# Patient Record
Sex: MALE | Race: WHITE | NOT HISPANIC OR LATINO | Employment: OTHER | URBAN - METROPOLITAN AREA
[De-identification: names, ages, dates, MRNs, and addresses within clinical notes are randomized per-mention and may not be internally consistent; named-entity substitution may affect disease eponyms.]

---

## 2024-12-02 ENCOUNTER — APPOINTMENT (OUTPATIENT)
Dept: RADIOLOGY | Facility: HOSPITAL | Age: 64
End: 2024-12-02
Payer: MEDICARE

## 2024-12-02 ENCOUNTER — HOSPITAL ENCOUNTER (EMERGENCY)
Facility: HOSPITAL | Age: 64
Discharge: HOME | End: 2024-12-02
Attending: EMERGENCY MEDICINE
Payer: MEDICARE

## 2024-12-02 VITALS
DIASTOLIC BLOOD PRESSURE: 91 MMHG | OXYGEN SATURATION: 97 % | SYSTOLIC BLOOD PRESSURE: 174 MMHG | HEART RATE: 73 BPM | HEIGHT: 68 IN | TEMPERATURE: 97.9 F | WEIGHT: 180 LBS | BODY MASS INDEX: 27.28 KG/M2 | RESPIRATION RATE: 20 BRPM

## 2024-12-02 DIAGNOSIS — S09.90XA HEAD INJURY, INITIAL ENCOUNTER: ICD-10-CM

## 2024-12-02 DIAGNOSIS — S52.601A CLOSED FRACTURE OF DISTAL END OF RIGHT ULNA, UNSPECIFIED FRACTURE MORPHOLOGY, INITIAL ENCOUNTER: ICD-10-CM
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
## 2024-12-02 DIAGNOSIS — R03.0 ELEVATED BLOOD PRESSURE READING: ICD-10-CM

## 2024-12-02 PROCEDURE — 12001 RPR S/N/AX/GEN/TRNK 2.5CM/<: CPT

## 2024-12-02 PROCEDURE — 2500000005 HC RX 250 GENERAL PHARMACY W/O HCPCS

## 2024-12-02 PROCEDURE — 73080 X-RAY EXAM OF ELBOW: CPT | Mod: RIGHT SIDE | Performed by: RADIOLOGY

## 2024-12-02 PROCEDURE — 72125 CT NECK SPINE W/O DYE: CPT | Performed by: RADIOLOGY

## 2024-12-02 PROCEDURE — 90715 TDAP VACCINE 7 YRS/> IM: CPT

## 2024-12-02 PROCEDURE — 70486 CT MAXILLOFACIAL W/O DYE: CPT

## 2024-12-02 PROCEDURE — 99284 EMERGENCY DEPT VISIT MOD MDM: CPT | Mod: 25 | Performed by: EMERGENCY MEDICINE

## 2024-12-02 PROCEDURE — 73590 X-RAY EXAM OF LOWER LEG: CPT | Mod: RT

## 2024-12-02 PROCEDURE — 73080 X-RAY EXAM OF ELBOW: CPT | Mod: RT

## 2024-12-02 PROCEDURE — 76377 3D RENDER W/INTRP POSTPROCES: CPT | Performed by: RADIOLOGY

## 2024-12-02 PROCEDURE — 73590 X-RAY EXAM OF LOWER LEG: CPT | Mod: RIGHT SIDE | Performed by: RADIOLOGY

## 2024-12-02 PROCEDURE — 2500000001 HC RX 250 WO HCPCS SELF ADMINISTERED DRUGS (ALT 637 FOR MEDICARE OP)

## 2024-12-02 PROCEDURE — 70450 CT HEAD/BRAIN W/O DYE: CPT | Performed by: RADIOLOGY

## 2024-12-02 PROCEDURE — 73110 X-RAY EXAM OF WRIST: CPT | Mod: RIGHT SIDE | Performed by: RADIOLOGY

## 2024-12-02 PROCEDURE — 73090 X-RAY EXAM OF FOREARM: CPT | Mod: RIGHT SIDE | Performed by: RADIOLOGY

## 2024-12-02 PROCEDURE — 29125 APPL SHORT ARM SPLINT STATIC: CPT | Mod: RT

## 2024-12-02 PROCEDURE — 90471 IMMUNIZATION ADMIN: CPT

## 2024-12-02 PROCEDURE — 2500000004 HC RX 250 GENERAL PHARMACY W/ HCPCS (ALT 636 FOR OP/ED)

## 2024-12-02 PROCEDURE — 72125 CT NECK SPINE W/O DYE: CPT

## 2024-12-02 PROCEDURE — 70486 CT MAXILLOFACIAL W/O DYE: CPT | Performed by: RADIOLOGY

## 2024-12-02 PROCEDURE — 76377 3D RENDER W/INTRP POSTPROCES: CPT

## 2024-12-02 PROCEDURE — 73090 X-RAY EXAM OF FOREARM: CPT | Mod: RT

## 2024-12-02 PROCEDURE — 73110 X-RAY EXAM OF WRIST: CPT | Mod: RT

## 2024-12-02 PROCEDURE — 70450 CT HEAD/BRAIN W/O DYE: CPT

## 2024-12-02 RX ORDER — HYDROCODONE BITARTRATE AND ACETAMINOPHEN 5; 325 MG/1; MG/1
1 TABLET ORAL ONCE
Status: COMPLETED | OUTPATIENT
Start: 2024-12-02 | End: 2024-12-02

## 2024-12-02 RX ORDER — IBUPROFEN 600 MG/1
600 TABLET ORAL EVERY 6 HOURS PRN
Qty: 25 TABLET | Refills: 0 | Status: SHIPPED | OUTPATIENT
Start: 2024-12-02 | End: 2024-12-09

## 2024-12-02 RX ORDER — ACETAMINOPHEN 500 MG
1000 TABLET ORAL EVERY 8 HOURS PRN
Qty: 18 TABLET | Refills: 0 | Status: SHIPPED | OUTPATIENT
Start: 2024-12-02 | End: 2024-12-05

## 2024-12-02 RX ORDER — BACITRACIN ZINC 500 UNIT/G
OINTMENT IN PACKET (EA) TOPICAL 3 TIMES DAILY
Status: DISCONTINUED | OUTPATIENT
Start: 2024-12-02 | End: 2024-12-02 | Stop reason: HOSPADM

## 2024-12-02 RX ORDER — LIDOCAINE HYDROCHLORIDE 10 MG/ML
20 INJECTION, SOLUTION INFILTRATION; PERINEURAL ONCE
Status: COMPLETED | OUTPATIENT
Start: 2024-12-02 | End: 2024-12-02

## 2024-12-02 RX ORDER — BACITRACIN ZINC 500 UNIT/G
OINTMENT IN PACKET (EA) TOPICAL ONCE
Status: COMPLETED | OUTPATIENT
Start: 2024-12-02 | End: 2024-12-02

## 2024-12-02 ASSESSMENT — COLUMBIA-SUICIDE SEVERITY RATING SCALE - C-SSRS
1. IN THE PAST MONTH, HAVE YOU WISHED YOU WERE DEAD OR WISHED YOU COULD GO TO SLEEP AND NOT WAKE UP?: NO
2. HAVE YOU ACTUALLY HAD ANY THOUGHTS OF KILLING YOURSELF?: NO
6. HAVE YOU EVER DONE ANYTHING, STARTED TO DO ANYTHING, OR PREPARED TO DO ANYTHING TO END YOUR LIFE?: NO

## 2024-12-02 ASSESSMENT — PAIN DESCRIPTION - ORIENTATION: ORIENTATION: RIGHT

## 2024-12-02 ASSESSMENT — PAIN SCALES - GENERAL
PAINLEVEL_OUTOF10: 6
PAINLEVEL_OUTOF10: 7
PAINLEVEL_OUTOF10: 6

## 2024-12-02 ASSESSMENT — PAIN - FUNCTIONAL ASSESSMENT
PAIN_FUNCTIONAL_ASSESSMENT: 0-10
PAIN_FUNCTIONAL_ASSESSMENT: 0-10

## 2024-12-02 ASSESSMENT — PAIN DESCRIPTION - LOCATION: LOCATION: ARM

## 2024-12-02 NOTE — ED PROVIDER NOTES
HPI   Chief Complaint   Patient presents with    Fall       64-year-old male presents to the ED today with a chief concern of fall.  Patient has a history of hypertension and hyperlipidemia.  He reports that he was working this morning.  He reports that he was standing in the back of a semitruck when it started to move.  He fell out the back of the truck and the entire case of bread fell on top of him.  He did not get run over by the semi-.  He hit his head on the ground and thought that he was going to die during the event.  He did not lose consciousness.  Is not anticoagulated.  He reports that since then he has right arm pain as well as right lower leg pain.  He reports that he had a little bit of tingling going into his right first, second, and third digit.  He denies any fever/chills, nausea/vomiting.  He denies any chest pain or shortness of breath or abdominal pain.  Did not sustain any other injuries.  He has no other symptoms or concern at this time.      History provided by:  Patient          Patient History   History reviewed. No pertinent past medical history.  History reviewed. No pertinent surgical history.  No family history on file.  Social History     Tobacco Use    Smoking status: Never    Smokeless tobacco: Never   Substance Use Topics    Alcohol use: Yes     Comment: socially    Drug use: Not on file       Physical Exam   ED Triage Vitals [12/02/24 0822]   Temperature Heart Rate Respirations BP   36.6 °C (97.9 °F) 79 20 (!) 190/101      Pulse Ox Temp Source Heart Rate Source Patient Position   98 % Oral -- --      BP Location FiO2 (%)     -- --       Physical Exam  General: The patient is sitting comfortably no acute distress.  Vital signs per nursing note.  Skin: Abrasions noted on the left side of the face.  No gaping.  There is a 0.75 cm laceration over the right elbow.  There is gaping.  There is no surrounding erythema or streaking.  HEENT: The head is normocephalic.  The neck is supple.   The trachea is midline.  No tenderness to palpation of the head. No enophthalmos or exophthalmos.  PERRLA, EOMI without nystagmus.  Negative raccoon eyes. External ear anatomy is normal.  TMs are white/gray and translucent.  Light reflex and bony landmarks are present.  No erythema, bulging, or retraction of the TM.  Negative lopez sign.  Hearing is grossly intact.  No epistaxis or rhinorrhea.  Lips and buccal mucosa are pink and moist without lesions.  Tongue is midline without lesions.  Uvula is midline with symmetric elevation of the soft palate.  Normal phonation.  No hoarseness.  No muffled voice.  Lungs: Lungs are clear to auscultation bilaterally.  No rhonchi, wheezing, or rales.  No stridor.  Symmetric chest expansion  Heart: Normal S1-S2 no murmurs, rubs, gallops.  Abdomen: Abdomen is flat, nontender, nondistended.  No rebound tenderness or guarding.  No pulsatile mass.   Neurologic: Alert and oriented x4.  Thought process is coherent.   5/5 strength in the upper and lower extremity.  Sensation is intact in the upper and lower extremity.  Normal gait.  Rapid alternating motor movements are intact.  Musculoskeletal: No overlying skin changes throughout the entire back.  No C, T, L spine tenderness. Full ROM of the neck and back.  Full range of motion of right elbow and wrist with no focal bony tenderness to palpation.  There is diffuse tenderness over the right forearm but compartments are soft.  There is a small abrasion on the right forearm.  There is no gaping.  : Deferred    ED Course & MDM   ED Course as of 12/02/24 2039   Mon Dec 02, 2024   1110 IMPRESSION:  Degenerative changes without acute fracture or subluxation.      Signed by: Mick Recio 12/2/2024 10:38 AM  Dictation workstation:   [id number]   []   1110 FINDINGS:  Four view radiographs of the right wrist and additional two-view  radiographs of the right forearm were obtained. There is linear  lucency through the distal ulnar  metaphysis, consistent with a  nondisplaced fracture. No additional fractures are identified in the  wrist or forearm. There is associated soft tissue swelling.  The alignment is anatomic.  Mild degenerative changes involve the triscaphe and 1st  carpometacarpal joint and to a lesser degree the elbow.      IMPRESSION:  Nondisplaced fracture of the distal ulnar metaphysis.      Signed by: Mick Recio 12/2/2024 10:41 AM  Dictation workstation:   [id number]   []   1110 XR wrist right 3+ views []   1111 IMPRESSION:  No evidence of acute intracranial abnormality or skull fracture.          Signed by: Susannah Belle 12/2/2024 11:01 AM  Dictation workstation:   [id number]   []   1255 1, 4-0 Prolene nonabsorbable suture []   1319 Patient has a ride home today will not be driving himself home [MC]      ED Course User Index  [MC] Adarsh Espitia PA-C         Diagnoses as of 12/02/24 2039   Fall, initial encounter   Head injury, initial encounter   Closed fracture of distal end of right ulna, unspecified fracture morphology, initial encounter   Elevated blood pressure reading                 No data recorded     [place] Coma Scale Score: 15 (12/02/24 0853 : Dain Fitzpatrick RN)                           Medical Decision Making  64-year-old male presents to the ED today with a chief concern of fall.  Vital signs reassuring.  Patient overall appears well and is nontoxic-appearing.  Patient has full range of motion of the neck without any meningismus.  Satting well on room air.  Not hypoxic.  Not tachycardic.  Afebrile. The injury was mechanical.  Did not sustain any symptoms prior to the injury.  X-ray of the right tib-fib shows degenerative changes.  X-ray of the right wrist shows a nondisplaced fracture of the distal ulnar metaphysis.  The right forearm x-ray shows similar findings but no additional fractures.  X-ray of the right elbow is unremarkable.  CT C-spine shows no acute fracture.  Does show some degenerative  changes.  CT maxillofacial bones shows no facial bone fracture.  Does show contusion and subcutaneous edema.  Patient is freely moving the remainder of extremities without any difficulty.  Tetanus updated in the ED.  He did have a 0.75 cm cm laceration over the right elbow which I repaired with 1 suture.  I discussed with patient that he needs to get this removed and 7 days.  We discussed signs and symptoms of infection as well as wound care.  Bacitracin was placed over the wound prior to discharge.  He was given [place] in the ED.  He felt better after administration of this medication.  Patient was placed in a volar splint by medics.  I evaluated this after placement neurovascular status intact.  Compartments are soft.  There is no concern for compartment syndrome.  Discussed my pression and findings with patient he feels comfortable returning home.  We discussed very strict return precautions including returning for any new or worsening signs home.  Patient is in agreement this plan.  He will follow-up with his PCP and orthopedics within 3 days.  Discharged with Tylenol and ibuprofen.  He has a ride home today will not be driving himself home.  Patient was also seen and evaluated by attending physician.    Differential diagnosis: Fracture, strain, sprain, compartment syndrome    Disposition/treatment  1.  See diagnosis    Shared decision-making was used patient feels comfortable returning home     Patient was advised to follow up with recommended provider in 1 day1 for another evaluation and exam. I advised patient/guardian to return or go to closest emergency room immediately if symptoms change, get worse, new symptoms develop prior to follow up. If there is no improvement in symptoms in the next 24 hours they are advised to return for further evaluation and exam. I also explained the plan and treatment course. Patient/guardian is in agreement with plan, treatment course, and follow up and states verbally that  they will comply.    Homegoing. I discussed the differential; results and discharge plan with the patient and/or family/friend/caregiver if present.  I emphasized the importance of follow-up with the physician I referred them to in the timeframe recommended.  I explained reasons for the patient to return to the Emergency Department. They agreed that if they feel their condition is worsening or if they have any other concern they should call 911 immediately for further assistance. I gave the patient an opportunity to ask all questions they had and answered all of them accordingly. They understand return precautions and discharge instructions. The patient and/or family/friend/caregiver expressed understanding verbally and that they would comply.        This note has been transcribed using voice recognition and may contain grammatical errors, misplaced words, incorrect words, incorrect phrases or other errors.        Procedure  Laceration Repair    Performed by: Adarsh Espitia PA-C  Authorized by: Rajesh Mendez MD    Consent:     Consent obtained:  Verbal    Consent given by:  Patient    Risks, benefits, and alternatives were discussed: yes      Risks discussed:  Infection, need for additional repair, nerve damage, pain, poor cosmetic result, vascular damage, tendon damage, retained foreign body and poor wound healing    Alternatives discussed:  No treatment  Comments:      Indications-partial thickness laceration of right elbow  Procedure, risks and benefits were discussed with the patient and all questions answered.  Verbal consent was obtained.   The right elbow laceration was prepped with sterile water and Hibiclens and draped in a normal clean fashion.  The wound was anesthetized with 2 cc 1% lidocaine without epinephrine by direct infiltration.  The wound was explored under proper lighting and hemostasis and no deep structures were seen to be involved.  The wound was explored through full range of motion and  in position of injury.  No foreign bodies were found.  The wound was copiously irrigated with 100 cc of normal saline.  The wound was closed with 1, 4-0 Prolene nonabsorbable suture in simple interrupted fashion.  The wound edges were approximated well with good hemostasis.  The procedure was tolerated well without complications.  No tourniquet was used.  Neurovascular status was intact following the procedure. The wound was dressed with bacitracin and a Band-Aid.  Wound care instructions provided.  Laceration 0.75 cm in length.         Adarsh Espitia PA-C  12/02/24 5913

## 2024-12-02 NOTE — ED TRIAGE NOTES
Pt was at work and a bread rack fell on him, pt states that he has a panic attack thinking he was being crushed. Pt c/o R-forearm pain, pt has abrasions to the L-side of his face.